# Patient Record
Sex: FEMALE | Race: WHITE | Employment: FULL TIME | URBAN - METROPOLITAN AREA
[De-identification: names, ages, dates, MRNs, and addresses within clinical notes are randomized per-mention and may not be internally consistent; named-entity substitution may affect disease eponyms.]

---

## 2019-03-29 DIAGNOSIS — M25.552 BILATERAL HIP PAIN: Primary | ICD-10-CM

## 2019-03-29 DIAGNOSIS — M25.551 BILATERAL HIP PAIN: Primary | ICD-10-CM

## 2019-04-03 ENCOUNTER — OFFICE VISIT (OUTPATIENT)
Dept: ORTHOPEDIC SURGERY | Age: 64
End: 2019-04-03
Payer: COMMERCIAL

## 2019-04-03 VITALS
HEART RATE: 84 BPM | SYSTOLIC BLOOD PRESSURE: 116 MMHG | HEIGHT: 69 IN | WEIGHT: 175 LBS | DIASTOLIC BLOOD PRESSURE: 75 MMHG | BODY MASS INDEX: 25.92 KG/M2

## 2019-04-03 DIAGNOSIS — M70.62 TROCHANTERIC BURSITIS OF BOTH HIPS: Primary | ICD-10-CM

## 2019-04-03 DIAGNOSIS — M70.61 TROCHANTERIC BURSITIS OF BOTH HIPS: Primary | ICD-10-CM

## 2019-04-03 PROCEDURE — 1036F TOBACCO NON-USER: CPT | Performed by: ORTHOPAEDIC SURGERY

## 2019-04-03 PROCEDURE — 3017F COLORECTAL CA SCREEN DOC REV: CPT | Performed by: ORTHOPAEDIC SURGERY

## 2019-04-03 PROCEDURE — G8419 CALC BMI OUT NRM PARAM NOF/U: HCPCS | Performed by: ORTHOPAEDIC SURGERY

## 2019-04-03 PROCEDURE — 99203 OFFICE O/P NEW LOW 30 MIN: CPT | Performed by: ORTHOPAEDIC SURGERY

## 2019-04-03 PROCEDURE — G8428 CUR MEDS NOT DOCUMENT: HCPCS | Performed by: ORTHOPAEDIC SURGERY

## 2019-04-03 RX ORDER — LISINOPRIL 20 MG/1
TABLET ORAL
COMMUNITY

## 2019-04-05 ENCOUNTER — HOSPITAL ENCOUNTER (OUTPATIENT)
Dept: PHYSICAL THERAPY | Facility: CLINIC | Age: 64
Setting detail: THERAPIES SERIES
Discharge: HOME | End: 2019-04-05
Payer: COMMERCIAL

## 2019-04-05 PROCEDURE — 97161 PT EVAL LOW COMPLEX 20 MIN: CPT

## 2019-04-05 PROCEDURE — 97110 THERAPEUTIC EXERCISES: CPT

## 2019-04-05 NOTE — CONSULTS
Normal Left tight Right tight   Hip flexor [] [x] [x]   quad [] [] []   HS [] [x] [x]   piriformis [] [x] [x]   ITB [] [] []   gastroc [] [x] [x]   Soleus  [] [x] [x]    [] [] []    [] [] []            Comments:       Assessment: Pt presents with bilateral hip pain secondary to arthritis as well as significant global hip tightness and weakness. Pt will benefit from hip stretching and glut strengthening to offset effects of arthritis. Pt also with decreased arch and over pronation, as well as gastroc equinus Bila (R worse than L), and has been instructed to purchase powersteps for arch support. STG: (to be met in 6 treatments)  1. ? Pain: Decrease pain levels to 5/10 at worse in order to perform daily activities  2. ? ROM: Increase flexibility and AROM limitations throughout to equal bilat to reduce difficulty with ADLs  3. ? Strength: Increase Bilat hip abduction strength to 4+/5  4. ? Function: Pt to report increased ability to ambulate community distances without pain  5. Independent with Home Exercise Programs    LTG: (to be met in 12 treatments)  1. Reduce pain levels to 0/10 in order to sleep throughout the night  2. Increase bilat hip abd and ext 5/5 strength                    Patient goals: To decrease pain in order to walk     Rehab Potential:  [x] Good  [] Fair  [] Poor   Suggested Professional Referral:  [x] No  [] Yes:  Barriers to Goal Achievement[de-identified]  [x] No  [] Yes:  Domestic Concerns:  [x] No  [] Yes:    Pt. Education:  [x] Plans/Goals, Risks/Benefits discussed  [x] Home exercise program    Method of Education: [x] Verbal  [x] Demo  [x] Written  Comprehension of Education:  [x] Verbalizes understanding. [x] Demonstrates understanding. [x] Needs Review. [] Demonstrates/verbalizes understanding of HEP/Ed previously given.     Treatment Plan:  [x] Therapeutic Exercise    [] Aquatic Therapy   [x] Manual Therapy     [] Electrical Stimulation  [x] Instruction in HEP      [] Lumbar/Cervical

## 2019-04-08 ENCOUNTER — HOSPITAL ENCOUNTER (OUTPATIENT)
Dept: PHYSICAL THERAPY | Facility: CLINIC | Age: 64
Setting detail: THERAPIES SERIES
Discharge: HOME | End: 2019-04-08
Payer: COMMERCIAL

## 2019-04-08 PROCEDURE — 97110 THERAPEUTIC EXERCISES: CPT

## 2019-04-08 PROCEDURE — 97140 MANUAL THERAPY 1/> REGIONS: CPT

## 2019-04-08 NOTE — FLOWSHEET NOTE
[] [facility]) [facility] &  Therapy  [address]  [phone]  F: [phone] [] [address]  [facility]   [address]  P: [phone]  F: [phone] [] [address]  Therapy  [address] [facility]  P: [phone]  F: [phone] [x] [facility]  Outpatient Rehabilitation &  Therapy  [address] [facility]   [address]  [place]: [phone]  F: [phone]     Physical Therapy Daily Treatment Note    Date:  2019  Patient Name:  Reymundo Quinn    :  1955  MRN: [id number]  Physician: Dr. Ever Buitrago: Medical [place]   Medical Diagnosis: Trochanteric Bursitis of Dudley Hips                       Rehab Codes: M70.61, M70.62  Onset date: 2016                             Next 's appt.: N/A      Visit# / total visits: 2/12  Cancels/No Shows: 0/0    Subjective:    Pain:  [x] Yes  [] No Location: Dudley hips Pain Rating: (0-10 scale) 2/10  Pain altered Tx:  [] No  [] Yes  Action:  Comments: Pt states pain was 8/10 in the middle of the night, has currently subsided to 2/10    Objective:  Modalities:   Exercises:  Dudley Hip Bursitis Time/Reps Weight Comments   Bike  10'                 Standing         SB stretch 3x30\"      HS stretch  3x30\"      TG squats  15x L17     4 way hip  10x Peach      Balance Board  5' L2           Supine         Piriformis stretch 3x30\"       HS stretch 3x30\"       Gastroc stretch 3x30\"       Hip flexor stretch 3x30\"        Clamshells 10x active Cueing for technique   Side lying hip abd 10x active Cueing for technique   Other: DI to Dudley Piriformis, DI to Dudley Hip flexor with noted relief after     Specific Instructions for next treatment: Assess Hip flexor and Piriformis tightness for DI prior to exercises,            Treatment Charges: Mins Units   []  Modalities     [x]  Ther Exercise 30

## 2019-04-10 ENCOUNTER — HOSPITAL ENCOUNTER (OUTPATIENT)
Dept: PHYSICAL THERAPY | Facility: CLINIC | Age: 64
Setting detail: THERAPIES SERIES
Discharge: HOME | End: 2019-04-10
Payer: COMMERCIAL

## 2019-04-10 PROCEDURE — 97110 THERAPEUTIC EXERCISES: CPT

## 2019-04-10 NOTE — FLOWSHEET NOTE
[] [facility]) [facility] &  Therapy  [address]  [phone]  F: [phone] [] [address]  [place] [address]   [address]  P: [phone]  F: [phone] [] [address]  Therapy  [address] [facility]  P: [phone]  F: [phone] [x] [facility]  Outpatient Rehabilitation &  Therapy  [address] [facility]   [address]  [place]: [phone]  F: [phone]     Physical Therapy Daily Treatment Note    Date:  4/10/2019  Patient Name:  Lupe Bell    :  1955  MRN: [id number]  Physician: Dr. Shani Dorman: Medical [place]   Medical Diagnosis: Trochanteric Bursitis of Dudley Hips                       Rehab Codes: M70.61, M70.62  Onset date: 2016                             Next 's appt.: N/A      Visit# / total visits: 3/12  Cancels/No Shows: 0/0    Subjective:    Pain:  [x] Yes  [] No Location: Dudley hips Pain Rating: (0-10 scale) 1-2/10  Pain altered Tx:  [] No  [] Yes  Action:  Comments: Pt states she felt less sore yesterday, noting improvement    Objective:  Modalities:   Exercises:  Dudley Hip Bursitis Time/Reps Weight Comments   Bike  10'                 Standing         SB stretch 3x30\"      HS stretch  3x30\"      TG squats  15x L17     4 way hip  10x Peach      Balance Board  5' L2           Supine         Piriformis stretch 3x30\"       HS stretch 3x30\"       Gastroc stretch 3x30\"       Hip flexor stretch 3x30\"        Clamshells 10x active Cueing for technique   Side lying hip abd 10x active Cueing for technique   Other:  DI to Dudley Hip flexor with noted relief after     Specific Instructions for next treatment: Assess Hip flexor and Piriformis tightness for DI prior to exercises,            Treatment Charges: Mins Units   []  Modalities     [x]  Ther Exercise 45 3   []  Manual Therapy     []  Ther

## 2019-04-15 ENCOUNTER — HOSPITAL ENCOUNTER (OUTPATIENT)
Dept: PHYSICAL THERAPY | Facility: CLINIC | Age: 64
Setting detail: THERAPIES SERIES
Discharge: HOME | End: 2019-04-15
Payer: COMMERCIAL

## 2019-04-15 PROCEDURE — 97110 THERAPEUTIC EXERCISES: CPT

## 2019-04-15 NOTE — FLOWSHEET NOTE
this visit with increased fatigue noted with no complaint of pain or soreness. Will continue strength progressions per patients tolerance. STG: (to be met in 6 treatments)  1. ? Pain: Decrease pain levels to 5/10 at worse in order to perform daily activities  2. ? ROM: Increase flexibility and AROM limitations throughout to equal bilat to reduce difficulty with ADLs  3. ? Strength: Increase Bilat hip abduction strength to 4+/5  4. ? Function: Pt to report increased ability to ambulate community distances without pain  5. Independent with Home Exercise Programs     LTG: (to be met in 12 treatments)  1. Reduce pain levels to 0/10 in order to sleep throughout the night  2. Increase bilat hip abd and ext 5/5 strength                     Patient goals: To decrease pain in order to walk         Pt. Education:  [x] Yes  [] No  [] Reviewed Prior HEP/Ed  Method of Education: [x] Verbal  [x] Demo  [] Written  Comprehension of Education:  [x] Verbalizes understanding. [x] Demonstrates understanding. [] Needs review. [] Demonstrates/verbalizes HEP/Ed previously given. Plan: [x] Continue per plan of care.    [] Other:      Time In: 0800            Time Out: 0900    Electronically signed by:  Sherryle Leber, PTA

## 2019-04-18 ENCOUNTER — HOSPITAL ENCOUNTER (OUTPATIENT)
Dept: PHYSICAL THERAPY | Facility: CLINIC | Age: 64
Setting detail: THERAPIES SERIES
Discharge: HOME | End: 2019-04-18
Payer: COMMERCIAL

## 2019-04-18 PROCEDURE — 97110 THERAPEUTIC EXERCISES: CPT

## 2019-04-18 NOTE — FLOWSHEET NOTE
[x] THE [facility] &  Therapy  [facility]   [address]  [place]: [phone]  F: [phone]     Physical Therapy Daily Treatment Note    Date:  2019  Patient Name:  Nelida Landau    :  1955  MRN: [id number]  Physician: Dr. Jessica Willoughby: Medical [place]   Medical Diagnosis: Trochanteric Bursitis of Dudley Hips                       Rehab Codes: M70.61, M70.62  Onset date: 2016                             Next 's appt.: N/A      Visit# / total visits:   Cancels/No Shows: 0/0    Subjective:    Pain:  [x] Yes  [] No Location: Dudley hips Pain Rating: (0-10 scale) 1-2/10  Pain altered Tx:  [x] No  [] Yes  Action:  Comments: Patient arrived stating she is having increased pain d/t the weather, which is typical for her. Objective:  Modalities:   Exercises:  Dudley Hip Bursitis Time/Reps Weight Comments   Bike  10'                 Standing         SB stretch 3x30\"      HS stretch  3x30\"      TG squats  20x L17     4 way hip  15x Peach     Balance Board  5' L2           Supine         Piriformis stretch 3x30\"       HS stretch 3x30\"       Gastroc stretch 3x30\"       Hip flexor stretch 3x30\"       Clamshells 20x active Cueing for technique   Side lying hip abd 20x active Cueing for technique   Bridges 20x                 Other:  DI to Dudley Hip flexor with noted relief after     Specific Instructions for next treatment: Assess Hip flexor and Piriformis tightness for DI prior to exercises,      Treatment Charges: Mins Units   []  Modalities     [x]  Ther Exercise 45 3   []  Manual Therapy     []  Ther Activities     []  Aquatics     []  Vasocompression     []  Other     Total Treatment time 45 3       Assessment: [x] Progressing toward goals. [] No change. [x] Other: Did not progress exercises this date d/t pt having increased pain. Will continue to access and will progress as able. STG: (to be met in 6 treatments)  1. ? Pain: Decrease pain levels to 5/10 at worse in order to perform daily activities  2. ? ROM: Increase flexibility and AROM limitations throughout to equal bilat to reduce difficulty with ADLs  3. ? Strength: Increase Bilat hip abduction strength to 4+/5  4. ? Function: Pt to report increased ability to ambulate community distances without pain  5. Independent with Home Exercise Programs     LTG: (to be met in 12 treatments)  1. Reduce pain levels to 0/10 in order to sleep throughout the night  2. Increase bilat hip abd and ext 5/5 strength                     Patient goals: To decrease pain in order to walk         Pt. Education:  [x] Yes  [] No  [] Reviewed Prior HEP/Ed  Method of Education: [x] Verbal  [x] Demo  [] Written  Comprehension of Education:  [x] Verbalizes understanding. [x] Demonstrates understanding. [] Needs review. [] Demonstrates/verbalizes HEP/Ed previously given. Plan: [x] Continue per plan of care.    [] Other:      Time In: 0831            Time Out: 7764     Electronically signed by:  Patsy Sparks PT

## 2019-04-22 ENCOUNTER — HOSPITAL ENCOUNTER (OUTPATIENT)
Dept: PHYSICAL THERAPY | Facility: CLINIC | Age: 64
Setting detail: THERAPIES SERIES
Discharge: HOME | End: 2019-04-22
Payer: COMMERCIAL

## 2019-04-22 PROCEDURE — 97110 THERAPEUTIC EXERCISES: CPT

## 2019-04-22 NOTE — FLOWSHEET NOTE
progressions with no complaint of pain. Will continue to assess patients response to treatment and progress strength program as tolerated. STG: (to be met in 6 treatments)  1. ? Pain: Decrease pain levels to 5/10 at worse in order to perform daily activities  2. ? ROM: Increase flexibility and AROM limitations throughout to equal bilat to reduce difficulty with ADLs  3. ? Strength: Increase Bilat hip abduction strength to 4+/5  4. ? Function: Pt to report increased ability to ambulate community distances without pain  5. Independent with Home Exercise Programs     LTG: (to be met in 12 treatments)  1. Reduce pain levels to 0/10 in order to sleep throughout the night  2. Increase bilat hip abd and ext 5/5 strength                     Patient goals: To decrease pain in order to walk     Pt. Education:  [x] Yes  [] No  [] Reviewed Prior HEP/Ed  Method of Education: [x] Verbal  [] Demo  [] Written  Comprehension of Education:  [x] Verbalizes understanding. [] Demonstrates understanding. [] Needs review. [] Demonstrates/verbalizes HEP/Ed previously given. Plan: [x] Continue per plan of care.    [] Other:      Time In: 0800              Time Out: 0900    Electronically signed by:  Pao Fields PTA

## 2019-04-24 ENCOUNTER — HOSPITAL ENCOUNTER (OUTPATIENT)
Dept: PHYSICAL THERAPY | Facility: CLINIC | Age: 64
Setting detail: THERAPIES SERIES
Discharge: HOME | End: 2019-04-24
Payer: COMMERCIAL

## 2019-04-24 PROCEDURE — 97110 THERAPEUTIC EXERCISES: CPT

## 2019-04-24 NOTE — FLOWSHEET NOTE
[x] THE [facility] &  Therapy  [facility]   [address]  [place]: [phone]  F: [phone]     Physical Therapy Daily Treatment Note    Date:  2019  Patient Name:  Lauren Jeronimo    :  1955  MRN: [id number]  Physician: Dr. Bustillos Branch: Medical [place]   Medical Diagnosis: Trochanteric Bursitis of Dudley Hips                       Rehab Codes: M70.61, M70.62  Onset date: 2016                             Next 's appt.: N/A    Visit# / total visits:   Cancels/No Shows: 0/0    Subjective:    Pain:  [x] Yes  [] No Location: Dudley hips Pain Rating: (0-10 scale) 1-2/10  Pain altered Tx:  [x] No  [] Yes  Action:  Comments: Patient arrived noting minor soreness this morning from doing yard work yesterday, no issues noted from previous visit. Objective:  Modalities:   Exercises:  Dudley Hip Bursitis Time/Reps Weight Comments   Bike  10'                 Standing         SB stretch  3x30\"      HS stretch  3x30\"      TG squats  20x L17     4 way hip  20x Peach     Balance Board  5' L2     Step Ups  x20 6\"    Lat step ups  x20 4\"    Rebounder  x20     Lunges  2x10           Supine         Piriformis stretch 3x30\"       HS stretch 3x30\"       Gastroc stretch 3x30\"       Hip flexor stretch 3x30\"       Clamshells 20x peach    Side lying hip abd 20x     Bridges 20x peach                Other:  DI to Dudley Hip flexor with noted relief after     Specific Instructions for next treatment: Continued hip strengthening per patient tolerance.      Treatment Charges: Mins Units   []  Modalities     [x]  Ther Exercise 45 3   []  Manual Therapy     []  Ther Activities     []  Aquatics     []  Vasocompression     []  Other     Total Treatment time 45 3       Assessment: [x] Progressing toward goals. [] No change.      [x] Other: Continued strength progressions this date, patient noted increased fatigue with progressions with no complaint of

## 2019-04-29 ENCOUNTER — HOSPITAL ENCOUNTER (OUTPATIENT)
Dept: PHYSICAL THERAPY | Facility: CLINIC | Age: 64
Setting detail: THERAPIES SERIES
Discharge: HOME | End: 2019-04-29
Payer: COMMERCIAL

## 2019-04-29 PROCEDURE — 97110 THERAPEUTIC EXERCISES: CPT

## 2019-04-29 NOTE — FLOWSHEET NOTE
[x] THE [facility] &  Therapy  [facility]   [address]  [place]: [phone]  F: [phone]     Physical Therapy Daily Treatment Note    Date:  2019  Patient Name:  Beryle Halt    :  1955  MRN: [id number]  Physician: Dr. Mcdonald Code: [facility] [place]   Medical Diagnosis: Trochanteric Bursitis of Dudley Hips                       Rehab Codes: M70.61, M70.62  Onset date: 2016                             Next 's appt.: N/A    Visit# / total visits:   Cancels/No Shows: 0/0    Subjective:    Pain:  [x] Yes  [] No Location: Dudley hips Pain Rating: (0-10 scale) 1-2/10  Pain altered Tx:  [x] No  [] Yes  Action:  Comments: Patient arrived noting minor soreness upon arrival with no complaint of pain. Objective:  Modalities:   Exercises:  Dudley Hip Bursitis Time/Reps Weight Comments   Bike  10'                 Standing         SB stretch  3x30\"      HS stretch  3x30\"      TG squats  20x L17     4 way hip  20x Peach     Balance Board  5' L2     Step Ups  x20 6\"    Lat step ups  x20 4\"    Heel Taps  2x10 2\"    Rebounder  x20     Lunges  2x10           Supine         Piriformis stretch 3x30\"       HS stretch 3x30\"       Gastroc stretch 3x30\"       Hip flexor stretch 3x30\"       Clamshells 20x peach    Side lying hip abd 20x     Bridges 20x peach                Other:  DI to Dudley Hip flexor with noted relief after     Specific Instructions for next treatment: Continued hip strengthening per patient tolerance.      Treatment Charges: Mins Units   []  Modalities     [x]  Ther Exercise 45 3   []  Manual Therapy     []  Ther Activities     []  Aquatics     []  Vasocompression     []  Other     Total Treatment time 45 3       Assessment: [x] Progressing toward goals. [] No change. [x] Other: Continued strength and stability progressions this date.  Patient continues to note increased fatigue with progressions with no complaint of pain. Minimal verbal cues this visit for technique. Will continue progressions per patients tolerance. STG: (to be met in 6 treatments)  1. ? Pain: Decrease pain levels to 5/10 at worse in order to perform daily activities  2. ? ROM: Increase flexibility and AROM limitations throughout to equal bilat to reduce difficulty with ADLs  3. ? Strength: Increase Bilat hip abduction strength to 4+/5  4. ? Function: Pt to report increased ability to ambulate community distances without pain  5. Independent with Home Exercise Programs     LTG: (to be met in 12 treatments)  1. Reduce pain levels to 0/10 in order to sleep throughout the night  2. Increase bilat hip abd and ext 5/5 strength                     Patient goals: To decrease pain in order to walk     Pt. Education:  [x] Yes  [] No  [] Reviewed Prior HEP/Ed  Method of Education: [x] Verbal  [] Demo  [] Written  Comprehension of Education:  [x] Verbalizes understanding. [] Demonstrates understanding. [] Needs review. [] Demonstrates/verbalizes HEP/Ed previously given. Plan: [x] Continue per plan of care.    [] Other:      Time In: 0800              Time Out: 0900    Electronically signed by:  Shiva Bernstein PTA

## 2019-05-01 ENCOUNTER — HOSPITAL ENCOUNTER (OUTPATIENT)
Dept: PHYSICAL THERAPY | Facility: CLINIC | Age: 64
Setting detail: THERAPIES SERIES
Discharge: HOME | End: 2019-05-01
Payer: COMMERCIAL

## 2019-05-01 PROCEDURE — 97110 THERAPEUTIC EXERCISES: CPT

## 2019-05-01 NOTE — FLOWSHEET NOTE
[x] THE [facility] &  Therapy  [facility]   [address]  [place]: [phone]  F: [phone]     Physical Therapy Daily Treatment Note    Date:  2019  Patient Name:  Damari Schwartz    :  1955  MRN: [id number]  Physician: Dr. Vern Sunshine: Medical [place]   Medical Diagnosis: Trochanteric Bursitis of Dudley Hips                       Rehab Codes: M70.61, M70.62  Onset date: 2016                             Next Dr's appt.: N/A    Visit# / total visits:   Cancels/No Shows: 0/0    Subjective:    Pain:  [x] Yes  [] No Location: Dudley hips Pain Rating: (0-10 scale) 2/10  Pain altered Tx:  [x] No  [] Yes  Action:  Comments: Patient stating difficulty sleeping last night, unsure of reason. Pt states she has noticed a great improvement in sleep overall, as well as improvement in ADLs. Pt agreeable for next week being pt's last two visits in therapy. Objective:  Modalities:   Exercises:  Dudley Hip Bursitis Time/Reps Weight Comments   Bike  10'                 Standing         SB stretch  3x30\"      HS stretch  3x30\"      TG squats  20x L17     4 way hip  20x Orange Increased 5/1   Balance Board  5' L2     Step Ups  x20 6\"    Lat step ups  x20 4\"    Heel Taps  2x10 4\" Increased 5/1   Rebounder  x20     Lunges  2x10           Supine         Piriformis stretch 3x30\"       HS stretch 3x30\"       Gastroc stretch 3x30\"       Hip flexor stretch 3x30\"       Clamshells 20x peach    Side lying hip abd 20x     Bridges 20x peach                Other:  DI to Dudley Hip flexor with noted relief after- not today      Specific Instructions for next treatment: Continued hip strengthening per patient tolerance.  Advance HEP for discharge next week.      Treatment Charges: Mins Units   []  Modalities     [x]  Ther Exercise 45 3   []  Manual Therapy     []  Ther Activities     []  Aquatics     []  Vasocompression     []  Other     Total Treatment time 45 3       Assessment: [x] Progressing toward goals. Pt tolerated increases in resistance this date without difficulty, no increase in pain noted. Pt agreeable for next being pt's end of therapy. [] No change. [x] Other:     STG: (to be met in 6 treatments)  1. ? Pain: Decrease pain levels to 5/10 at worse in order to perform daily activities  2. ? ROM: Increase flexibility and AROM limitations throughout to equal bilat to reduce difficulty with ADLs  3. ? Strength: Increase Bilat hip abduction strength to 4+/5  4. ? Function: Pt to report increased ability to ambulate community distances without pain  5. Independent with Home Exercise Programs     LTG: (to be met in 12 treatments)  1. Reduce pain levels to 0/10 in order to sleep throughout the night  2. Increase bilat hip abd and ext 5/5 strength                     Patient goals: To decrease pain in order to walk     Pt. Education:  [x] Yes  [] No  [] Reviewed Prior HEP/Ed  Method of Education: [x] Verbal  [] Demo  [] Written  Comprehension of Education:  [x] Verbalizes understanding. [] Demonstrates understanding. [] Needs review. [] Demonstrates/verbalizes HEP/Ed previously given. Plan: [x] Continue per plan of care.    [] Other:      Time In: 0800              Time Out: 0900    Electronically signed by:  Dalton Guerrero, PT

## 2019-05-06 ENCOUNTER — HOSPITAL ENCOUNTER (OUTPATIENT)
Dept: PHYSICAL THERAPY | Facility: CLINIC | Age: 64
Setting detail: THERAPIES SERIES
Discharge: HOME | End: 2019-05-06
Payer: COMMERCIAL

## 2019-05-06 PROCEDURE — 97110 THERAPEUTIC EXERCISES: CPT

## 2019-05-06 NOTE — FLOWSHEET NOTE
[x] [facility]  Outpatient Rehabilitation &  Therapy  [facility]   [address]  [place]: [phone]  F: [phone]     Physical Therapy Daily Treatment Note    Date:  2019  Patient Name:  Beryle Halt    :  1955  MRN: [id number]  Physician: Dr. 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 Pt agrees to have this week be her last visits.      Objective:  Modalities:   Exercises:  Dudley Hip Bursitis Time/Reps Weight Comments    Bike  10'     x              Standing          SB stretch  3x30\"    x   *HS stretch  3x30\"    x   TG squats  20x L17   x   *4 way hip  20x Orange  x   Balance Board  5' L2   --   *Step Ups  x20 6\"  x   *Lat step ups  x20 4\"  x   *Heel Taps  2x10 4\"  x   Rebounder  x20   x   *Lunges  2x10   x          Supine          *Piriformis stretch 10x10\"     x   HS stretch 3x30\"     x   Gastroc stretch 3x30\"     --   *Hip flexor stretch 3x30\"     x   *Clamshells 20x peach  x   *Side lying hip abd 20x   x   Bridges 20x peach  x                 Other:  DI to Dudley Hip flexor with noted relief after- not today      Specific Instructions for next treatment: Review HEP for discharge.      Treatment Charges: Mins Units   []  Modalities     [x]  Ther Exercise 45 3   []  Manual Therapy     []  Ther Activities     []  Aquatics     []  Vasocompression     []  Other     Total Treatment time 45 3       Assessment: [x] Progressing

## 2019-05-08 ENCOUNTER — HOSPITAL ENCOUNTER (OUTPATIENT)
Dept: PHYSICAL THERAPY | Facility: CLINIC | Age: 64
Setting detail: THERAPIES SERIES
Discharge: HOME | End: 2019-05-08
Payer: COMMERCIAL

## 2019-05-08 PROCEDURE — 97110 THERAPEUTIC EXERCISES: CPT

## 2019-05-08 NOTE — FLOWSHEET NOTE
Decrease pain levels to 5/10 at worse in order to perform daily activities  2. ? ROM: Increase flexibility and AROM limitations throughout to equal bilat to reduce difficulty with ADLs  3. ? Strength: Increase Bilat hip abduction strength to 4+/5  4. ? Function: Pt to report increased ability to ambulate community distances without pain  5. Independent with Home Exercise Programs     LTG: (to be met in 12 treatments)  1. Reduce pain levels to 0/10 in order to sleep throughout the night  2. Increase bilat hip abd and ext 5/5 strength                     Patient goals: To decrease pain in order to walk     Pt. Education:  [x] Yes  [] No  [] Reviewed Prior HEP/Ed  Method of Education: [x] Verbal  [] Demo  [] Written  Comprehension of Education:  [x] Verbalizes understanding. [] Demonstrates understanding. [] Needs review. [] Demonstrates/verbalizes HEP/Ed previously given. Plan: [] Continue per plan of care. [x] Other: Discharge to [facility] at this time.        Time In: 0800              Time Out: 0900    Electronically signed by:  Jem Scherer PTA

## 2019-05-08 NOTE — DISCHARGE SUMMARY
[] [facility]  [facility]  [facility]  [address]  [phone]  F: [phone] [] [address]  [facility]   [address]  P: [phone]  F: [phone] [] [address]  Therapy  [address] [facility] [address]  P: [phone]  F: [phone] [x] [address]  [facility]   [address]   [place]: [phone]  F: [phone]     Physical Therapy Discharge Note    Date: 2019      Patient: Deacon Dalal  : 1955  MRN: [id number]    Physician: Dr. Nadine Sneed: [facility]   Medical Diagnosis: Trochanteric Bursitis of [place] Hips                       Rehab Codes: M70.61, M70.62  Onset date: 2016                             Next Dr's appt.: N/A     Visit# / total visits:                   Cancels/No Shows: 0/0     Subjective:    Pain:  [x] Yes  [] No          Location: [place] hips         Pain Rating: (0-10 scale) 2/10  Pain altered Tx:  [x] No  [] Yes  Action:  Comments: Patient arrived noting only minor soreness upon arrival.         Objective:  Function: Pt reports increased ability to ambulate and stand at work without pain    Assessment:    STG: (to be met in 6 treatments)  1. ? Pain: Decrease pain levels to 5/10 at worse in order to perform daily activities MET  2. ? ROM: Increase flexibility and AROM limitations throughout to equal bilat to reduce difficulty with ADLs MET  3. ? Strength: Increase Bilat hip abduction strength to 4+/5 MET  4. ? Function: Pt to report increased ability to ambulate community distances without pain MET  5. Independent with Home Exercise Programs MET     LTG: (to be met in 12 treatments)  1. Reduce pain levels to 0/10 in order to sleep throughout the night MET  2.  Increase bilat hip abd and ext 5/5 strength MET         Patient goals: To decrease pain in order to walk MET         Treatment to Date:  [x] Therapeutic Exercise    [] Modalities:  [] Therapeutic Activity    [] Ultrasound  [] Electrical Stimulation  [] Gait Training     [] Massage       [] Lumbar/Cervical Traction  [] Neuromuscular Re-education [] Cold/hotpack [] Iontophoresis: 4 mg/mL  [] Instruction in Home Exercise Program                     Dexamethasone Sodium  [x] Manual Therapy             Phosphate 40-80 mAmin  [] Aquatic Therapy                   [] Vasocompression/    [] Other:             Game Ready    Discharge Status:     [x] Pt recovered from conditions. Treatment goals were met. [] Pt received maximum benefit. No further therapy indicated at this time. [x] Pt to continue exercise/home instructions independently. Pt agreeable to continue with HEP    [] Therapy interrupted due to:    [] Pt has 2 or more no shows/cancels, is discontinued per our policy. [] Pt has completed prescribed number of treatment sessions. [] Other:         Electronically signed by Brittnee Ibarra PT on 5/8/2019 at 10:58 AM      If you have any questions or concerns, please don't hesitate to call.   Thank you for your referral.